# Patient Record
(demographics unavailable — no encounter records)

---

## 2024-10-09 NOTE — HISTORY OF PRESENT ILLNESS
[FreeTextEntry1] : Patient returns 3 weeks following direct excision of soft tissue excess and liposuction of chest. Denies any significant issues. Feeling well overall.

## 2024-10-09 NOTE — PHYSICAL EXAM
[de-identified] : Improved overall contour. Improved fullness of left medial chest. Resolved maceration of right NAC incision.

## 2024-10-09 NOTE — PHYSICAL EXAM
[de-identified] : Improved overall contour. Improved fullness of left medial chest. Resolved maceration of right NAC incision.

## 2024-10-09 NOTE — REASON FOR VISIT
[Follow-Up: _____] : a [unfilled] follow-up visit [FreeTextEntry1] : Dos: 09/18/2024; S/P: Excision of soft tissue excess and liposuction of chest.

## 2024-10-09 NOTE — ASSESSMENT
[FreeTextEntry1] : No evidence of infection or collection. Wound care, activity restrictions, need for compression were reviewed. Follow-up in 3 weeks for reassessment.

## 2024-10-24 NOTE — PHYSICAL EXAM
[No Acute Distress] : no acute distress [Well Nourished] : well nourished [Well Developed] : well developed [Well-Appearing] : well-appearing [Normal Voice/Communication] : normal voice/communication [Normal Gait] : normal gait [Speech Grossly Normal] : speech grossly normal [Memory Grossly Normal] : memory grossly normal [Normal Affect] : the affect was normal [Alert and Oriented x3] : oriented to person, place, and time [Normal Mood] : the mood was normal [Normal Insight/Judgement] : insight and judgment were intact

## 2024-10-24 NOTE — HISTORY OF PRESENT ILLNESS
[FreeTextEntry1] : Hyperlipidemia  [de-identified] : Follow up on labs ~8 weeks after starting rosuvastatin 40mg Tolerating the medication well

## 2024-10-24 NOTE — HISTORY OF PRESENT ILLNESS
[FreeTextEntry1] : Hyperlipidemia  [de-identified] : Follow up on labs ~8 weeks after starting rosuvastatin 40mg Tolerating the medication well

## 2024-10-28 NOTE — PHYSICAL EXAM
[de-identified] : Improved overall contour. Improved fullness of left medial chest. No significant areas of fullness, fluctuance, or erythema.

## 2024-10-28 NOTE — REASON FOR VISIT
[Post Op: _________] : a [unfilled] post op visit [FreeTextEntry1] : Dos: 09/18/2024; S/P: Excision of soft tissue excess and liposuction of chest.

## 2024-10-28 NOTE — HISTORY OF PRESENT ILLNESS
[FreeTextEntry1] : The patient returns about 6 weeks following direct excision of soft tissue excess and liposuction of chest. Denies any significant issues. States she is happy with the result.

## 2025-02-27 NOTE — HEALTH RISK ASSESSMENT
[Yes] : Yes [4 or more  times a week (4 pts)] : 4 or more  times a week (4 points) [3 or 4 (1 pt)] : 3 or 4  (1 point) [Less than monthly (1 pt)] : Less than monthly (1 point) [de-identified] : At least 2-3 beers daily

## 2025-02-27 NOTE — HISTORY OF PRESENT ILLNESS
[FreeTextEntry1] : IGGY [de-identified] : #Familial HLD #Family hx of ASCVD as listed  Oct 2024: , apoB 113 / Aug 2024 Lp(a) negative  On rosuvastatin 40mg with ezetimibe 10mg added in Oct 2024   #Chronic GI distress Describes bloating, nausea Saw [facility] Dr. Andrae CANCHOLA records reviewed from 2/21/25 Noted new transaminitis AST/ALT 36->59/33->71 w/ normal AlkP in 70s Noted elevated lipase 224 US abdomen report reviewed: FINDINGS - Liver: Within normal limits. Patent hepatic vasculature. Bile ducts: Normal caliber. Common bile duct measures 3 mm. Gallbladder: Within normal limits. Pancreas: Visualized portions are within normal limits. Spleen: 10.8 cm. Within normal limits. Right kidney: 11.3 cm. No hydronephrosis. Left kidney: 11.5 cm. No hydronephrosis. Ascites: None. Aorta and IVC: Visualized portions are within normal limits. IMPRESSION: Normal abdominal ultrasound. All prescribed medications reviewed as listed. New transaminitis could be i/s/o introducing ezetimibe on top of high dose rosuvastatin but no clear etiology of pancreatitis based on her list. She does drink alcohol, see below. Supplements she takes include Black Cohosh, CoQ-10, charcoal supplement   #Weight gain C/o ~10 lbs gain in less than a year Working with a  - weightlifting, row/elliptical/bike

## 2025-02-27 NOTE — ASSESSMENT
[FreeTextEntry1] : #Familial HLD #Family hx of ASCVD as listed  Oct 2024: , apoB 113 / Aug 2024 Lp(a) negative  On rosuvastatin 40mg with ezetimibe 10mg added in Oct 2024  - Repeat lipid panel, ApoB - Obtain CCS  #Chronic GI distress (bloating, nausea) #New transaminitis #Elevated lipase / mild pancreatitis  - Repeat LFT, lipase, check hep panel - Stop alcohol - Stop all supplements - F/u with Dr. Abebe   #Weight gain - F[facility]u at [facility]

## 2025-02-27 NOTE — PHYSICAL EXAM
[No Acute Distress] : no acute distress [Well Nourished] : well nourished [Well Developed] : well developed [Well-Appearing] : well-appearing [Normal Voice/Communication] : normal voice/communication [Normal Sclera/Conjunctiva] : normal sclera/conjunctiva [EOMI] : extraocular movements intact [Normal Outer Ear/Nose] : the outer ears and nose were normal in appearance [Supple] : supple [No Respiratory Distress] : no respiratory distress  [Normal Rate] : normal rate  [Soft] : abdomen soft [Non-distended] : non-distended [No Rash] : no rash [Normal Gait] : normal gait [Alert and Oriented x3] : oriented to person, place, and time [Normal Insight/Judgement] : insight and judgment were intact [de-identified] : She appeared anxious/restless

## 2025-02-27 NOTE — PHYSICAL EXAM
[No Acute Distress] : no acute distress [Well Nourished] : well nourished [Well Developed] : well developed [Well-Appearing] : well-appearing [Normal Voice/Communication] : normal voice/communication [Normal Sclera/Conjunctiva] : normal sclera/conjunctiva [EOMI] : extraocular movements intact [Normal Outer Ear/Nose] : the outer ears and nose were normal in appearance [Supple] : supple [No Respiratory Distress] : no respiratory distress  [Normal Rate] : normal rate  [Soft] : abdomen soft [Non-distended] : non-distended [No Rash] : no rash [Normal Gait] : normal gait [Alert and Oriented x3] : oriented to person, place, and time [Normal Insight/Judgement] : insight and judgment were intact [de-identified] : She appeared anxious/restless

## 2025-05-01 NOTE — HISTORY OF PRESENT ILLNESS
[FreeTextEntry1] : Elevated ALT/AST HLD - managed w/ rosuvastatin 40mg and ezetimibe 10mg [de-identified] : Last LFT done 3/11 AST 54 -> 129 / ALT 55 -> 116  Took compounded semaglutide 2/28, 3/7 First dose was after initial transaminitis on 2/26 so can likely not be the side effect of any compound in this medication  Overall feels her GI disturbances have all resolved ever since she stopped rosuvastatin Is not interested in taking these medications again  Declining Shingrix vaccine today Planning trip to [place] in May and will come later to get all vaccines before the trip

## 2025-05-01 NOTE — ASSESSMENT
[FreeTextEntry1] : Familial HLD CCS 0 / Lp(a) negative Re-check LFT, lipid profile, apoB Need to explore other therapies now such as [id number] Should bempedoic acid be attempted first? Will refer to cardiology  Once the above is handled, will assist her w/ desired weight loss Advised against compounded medications Can try cash pay Zepbound She will f/u

## 2025-05-01 NOTE — PHYSICAL EXAM
[No Acute Distress] : no acute distress [Well Nourished] : well nourished [Well Developed] : well developed [Well-Appearing] : well-appearing [Normal Voice/Communication] : normal voice/communication [Normal Sclera/Conjunctiva] : normal sclera/conjunctiva [EOMI] : extraocular movements intact [Normal Outer Ear/Nose] : the outer ears and nose were normal in appearance [Supple] : supple [No Respiratory Distress] : no respiratory distress  [No Accessory Muscle Use] : no accessory muscle use [Normal Rate] : normal rate  [Alert and Oriented x3] : oriented to person, place, and time [Normal Insight/Judgement] : insight and judgment were intact

## 2025-05-01 NOTE — HISTORY OF PRESENT ILLNESS
[FreeTextEntry1] : Elevated ALT/AST HLD - managed w/ rosuvastatin 40mg and ezetimibe 10mg [de-identified] : Last LFT done 3/11 AST 54 -> 129 / ALT 55 -> 116  Took compounded semaglutide 2/28, 3/7 First dose was after initial transaminitis on 2/26 so can likely not be the side effect of any compound in this medication  Overall feels her GI disturbances have all resolved ever since she stopped rosuvastatin Is not interested in taking these medications again  Declining Shingrix vaccine today Planning trip to [place] in May and will come later to get all vaccines before the trip